# Patient Record
(demographics unavailable — no encounter records)

---

## 2025-07-13 NOTE — HISTORY OF PRESENT ILLNESS
[FreeTextEntry1] : Patient is a 78 yo M with HTN, hyponatremia. BPH, IBS, OA, orthostatic hypotension who presents for evaluation and treatment of hyponatremia  AI assisted note with edits made below  - Summary : The patient is a male presenting for evaluation of hyponatremia discovered on recent blood work by his primary care physician. He reports fatigue, weakness, and cognitive issues. - Chief Complaint (CC) : Follow-up for low sodium levels discovered on recent blood work. - History of Present Illness : The patient is a male who was referred by his primary care physician, Dr. Connors, for evaluation of hyponatremia. Recent blood tests revealed a sodium level of 127 mEq/L, which prompted concern from his doctor. The patient was instructed to restrict fluid intake to 32 ounces per day and undergo repeat blood tests. Subsequent testing showed a slight improvement with sodium levels between 127-130 mEq/L. The patient reports experiencing weakness and fatigue for an extended period, which his wife notes has worsened over the past six months. He also mentions cognitive issues, including memory problems and difficulty concentrating, which have become more noticeable in the last six months. The patient describes a history of orthostatic hypotension, often feeling like he might faint when standing up quickly. He has a scheduled MRI of the brain and CT scan of the chest in the coming week to rule out potential underlying causes. The patient also mentions a severe autoimmune skin condition that flares up every spring and summer, causing intense itching and discomfort. - Past Medical History : Hypertension, Benign Prostatic Hyperplasia (BPH), Gastroesophageal Reflux Disease (GERD), Depression, Hypothyroidism, Small Intestinal Bacterial Overgrowth (SIBO), Chronic Fatigue Syndrome, Fibromyalgia, Orthostatic Hypotension, Autoimmune Skin Condition - Past Surgical History : No significant surgical history mentioned - Family History : - Sister with multiple medical conditions  - Family history of suspected lupus  - Social History : - Retired  - Lives with wife  - No current exercise routine  - Limited sun exposure due to skin condition Denies smoking, etoh, ivdu - Review of Systems : - General : Reports fatigue, weakness - Neurological : Experiences dizziness, near-fainting episodes, memory issues, difficulty concentrating - Musculoskeletal : Reports weakness - Cardiovascular : History of orthostatic hypotension - Respiratory : No issues reported - Gastrointestinal : History of GERD and SIBO - Genitourinary : History of BPH - Integumentary : Severe autoimmune skin condition with flare-ups in spring and summer - Psychiatric : History of depression - Medications : - Atenolol 25mg twice daily  - Bupropion 300mg XL daily  - Omeprazole 40mg daily  - Vitamin B12 1000mcg sublingual daily  - Azithromycin (Zithromax) as needed for SIBO  - Finasteride  - Levothyroxine  - Restasis for dry eye  - Allergies : - Sun/light sensitivity  Objective: - Diagnostic Results : Recent sodium levels 127 mEq/L, followed by 127-130 mEq/L on repeat testing. Current sodium level 133 mEq/L. Serum osmolarity 284 mOsm/kg (normal). Cortisol Slightly elevated. Urine osmolarity 400 mOsm/kg. Urine sodium 40 mEq/L.

## 2025-07-13 NOTE — PHYSICAL EXAM
[General Appearance - Alert] : alert [General Appearance - In No Acute Distress] : in no acute distress [Sclera] : the sclera and conjunctiva were normal [PERRL With Normal Accommodation] : pupils were equal in size, round, and reactive to light [Extraocular Movements] : extraocular movements were intact [Outer Ear] : the ears and nose were normal in appearance [Oropharynx] : the oropharynx was normal [Neck Appearance] : the appearance of the neck was normal [Neck Cervical Mass (___cm)] : no neck mass was observed [Jugular Venous Distention Increased] : there was no jugular-venous distention [Thyroid Diffuse Enlargement] : the thyroid was not enlarged [Thyroid Nodule] : there were no palpable thyroid nodules [Auscultation Breath Sounds / Voice Sounds] : lungs were clear to auscultation bilaterally [Heart Rate And Rhythm] : heart rate was normal and rhythm regular [Heart Sounds] : normal S1 and S2 [Murmurs] : no murmurs [Heart Sounds Gallop] : no gallops [Heart Sounds Pericardial Friction Rub] : no pericardial rub [Edema] : there was no peripheral edema [Veins - Varicosity Changes] : there were no varicosital changes [Bowel Sounds] : normal bowel sounds [Abdomen Soft] : soft [Abdomen Tenderness] : non-tender [Abdomen Mass (___ Cm)] : no abdominal mass palpated [No CVA Tenderness] : no ~M costovertebral angle tenderness [No Spinal Tenderness] : no spinal tenderness [Abnormal Walk] : normal gait [Involuntary Movements] : no involuntary movements were seen [Skin Color & Pigmentation] : normal skin color and pigmentation [Skin Turgor] : normal skin turgor [] : no rash [Cranial Nerves] : cranial nerves 2-12 were intact [No Focal Deficits] : no focal deficits [Affect] : the affect was normal [Mood] : the mood was normal

## 2025-07-13 NOTE — ASSESSMENT
[FreeTextEntry1] : A Focused Ultrasound of the: [   ] Limited Chest [ X  ] Limited Echo [X   ] Limited Retroperitoneal [  X ] Limited Abdominal [   ] Limited Vascular   Indication: Voljume status, hyponatremia, orthostatic hypotension   Acquisition: Bilateral kidneys short and long views of kidneys. VExUS, PLAX PSAX apical subxiphoid view of heart obtained The study was technically limited for the following reasons: None Findings:  Normal EF, no pericardial effusion, no obvious wall motion abnormalities or signs of volume overload, IVC flattens completely early in inspiration VExUS negative, 0 in hepatic portal and renal vein Bilateral kidneys normal length, normal cortical thickness, normal echogenicity, no hydronephrosis or stones visualized   Interpretation: Volume down, otherwise normal pocus of heart and kidneys   The following follow-up studies are indicated: None   Archival System: Images were saved to [employer]

## 2025-07-13 NOTE — ASSESSMENT
[FreeTextEntry1] : A Focused Ultrasound of the: [   ] Limited Chest [ X  ] Limited Echo [X   ] Limited Retroperitoneal [  X ] Limited Abdominal [   ] Limited Vascular   Indication: Voljume status, hyponatremia, orthostatic hypotension   Acquisition: Bilateral kidneys short and long views of kidneys. VExUS, PLAX PSAX apical subxiphoid view of heart obtained The study was technically limited for the following reasons: None Findings:  Normal EF, no pericardial effusion, no obvious wall motion abnormalities or signs of volume overload, IVC flattens completely early in inspiration VExUS negative, 0 in hepatic portal and renal vein Bilateral kidneys normal length, normal cortical thickness, normal echogenicity, no hydronephrosis or stones visualized   Interpretation: Volume down, otherwise normal pocus of heart and kidneys   The following follow-up studies are indicated: None   Archival System: Images were saved to [employer]

## 2025-07-28 NOTE — PHYSICAL EXAM
[General Appearance - Alert] : alert [General Appearance - In No Acute Distress] : in no acute distress [General Appearance - Well Developed] : well developed [Sclera] : the sclera and conjunctiva were normal [Outer Ear] : the ears and nose were normal in appearance [Neck Appearance] : the appearance of the neck was normal [Thyroid Diffuse Enlargement] : the thyroid was not enlarged [Respiration, Rhythm And Depth] : normal respiratory rhythm and effort [Auscultation Breath Sounds / Voice Sounds] : lungs were clear to auscultation bilaterally [Heart Rate And Rhythm] : heart rate was normal and rhythm regular [Bowel Sounds] : normal bowel sounds [Abdomen Soft] : soft [Musculoskeletal - Swelling] : no joint swelling seen [] : no rash [FreeTextEntry1] : Skin discoloration of the bilateral feet and stocking pattern out any notable rash throughout the body.  Slight erythema of the discoloration of the bilateral feet. [No Focal Deficits] : no focal deficits [Oriented To Time, Place, And Person] : oriented to person, place, and time

## 2025-07-28 NOTE — DATA REVIEWED
[FreeTextEntry1] : June 2025 per [facility] show TSH is 0.15 which is slightly below the normal range with normal B12 levels.  July 2, 2025 TSH is 1.24 which is normal.  July 18, 2025 creatinine is 0.99 with EGFR 78 with normal LFTs, and sodium level is 133 which is low, chloride 96 which is low, CBC is within normal range with urinalysis normal as well.  MRI of the lumbar spine done in 2023 is consistent with multilevel DJD.  See scanned documentation

## 2025-07-28 NOTE — ASSESSMENT
[FreeTextEntry1] :   78 yo M w/ family history of autoimmune (sister dx with h/o multiple diagnoses in past including lupus and Sjogrens and ?dermatomyositis but unclear unifying diagnosis), hypothyroidism, neurology seen in approx 2008 found only to have neuropathy in right hand but no dx officially, BPH, HTN, IBS and SIBO, osteoarthritis (cervical, thoracic, lumbar, Ilda's, left hip replacement), hyponatremia (sees Dr. Meneses), h/o bladder cancer (5 times but in remission, 0 grade papillary carcinoma and some chemo locally and resections) here for evaluation of underlying rheumatic disease given his photosensitive skin and rashes as well as longstanding fatigue.  Patient also describes a burning sensation of the skin.  Differential diagnosis includes but not limited to porphyria, multiple myeloma, Sjogren syndrome, atypical rheumatoid arthritis, vasculitis, lupus however patient has minimal symptoms to confer this, Lyme disease and ankylosing spondylitis.  Could consider also malignancy given unintentional weight loss.  - Check extensive labs for above differential diagnosis with inflammatory markers - Referral to dermatology to do skin biopsy with immunofluorescence staining and eval for connective tissue disease.  Would also favor checking deeper tissue for small fiber neuropathy. - Will wait on doing neurology referral in future for possible EMG to assess the burning sensation.  Return to the office in approximately 4 weeks to go over laboratory data

## 2025-07-28 NOTE — REVIEW OF SYSTEMS
[Recent Weight Loss (___ Lbs)] : recent [unfilled] ~Ulb weight loss [Dry Eyes] : dryness of the eyes [SOB on Exertion] : shortness of breath during exertion [Joint Pain] : joint pain [Itching] : itching [Fever] : no fever [Red Eyes] : eyes not red [Loss Of Hearing] : no hearing loss [Chest Pain] : no chest pain [Shortness Of Breath] : no shortness of breath [Cough] : no cough [Abdominal Pain] : no abdominal pain [Melena] : no melena [Joint Swelling] : no joint swelling [de-identified] : no focal changes.

## 2025-07-28 NOTE — HISTORY OF PRESENT ILLNESS
[FreeTextEntry1] : initial visit on July 28th 2025  78 yo M w/ family history of autoimmune (sister dx with h/o multiple diagnoses in past including lupus and Sjogrens and ?dermatomyositis but unclear unifying diagnosis), hypothyroidism, neurology seen in approx 2008 found only to have neuropathy in right hand but no dx officially, BPH, HTN, IBS and SIBO, osteoarthritis (cervical, thoracic, lumbar, Ilda's, left hip replacement), hyponatremia (sees Dr. Meneses), h/o bladder cancer (5 times but in remission, 0 grade papillary carcinoma and some chemo locally and resections) here for initial visit for skin issues.    "allergy to the sun" with sun sensitivity for many years, wears LOTS of sun protection (clothing, sunscreen, hat) and covers everything.  now is here for this.  it is worse in the last 3-6 months and "skin is on fire" from the sun and otherwise.  feels like pins and needles.  self referred.  Dr. Meneses nephrology gave my name for rheum.  burning pain and prickly in all of the skin.  A/C helps.  cool weather helps.  triggered yearly by sun.  but this is worst than ever.  feels like he has to be inside.  red rash after sun.  is SOB due to inactivity and poor exercise tolerance.  extreme fatigue for 1 + years but has had fatigued for 10 years or more.  h/o work up with rheumatology with Dr. Chet Adams and other doctors as well.  hands numb in cold on right.  + joint pain for years - neck, low back, hips, shoulders, fingers, ankles = pain comes and goes.  right ankle injury many times.  pain in joints is 3-6/10.  sometimes neck goes to 7/10.  + dry eyes for many years.  uses restasis.   ROS: no chest pain or other SOB, no fevers, no urinary changes, no Raynaud's, no swelling of joints, no blood clots,